# Patient Record
Sex: FEMALE | Race: WHITE | NOT HISPANIC OR LATINO | URBAN - METROPOLITAN AREA
[De-identification: names, ages, dates, MRNs, and addresses within clinical notes are randomized per-mention and may not be internally consistent; named-entity substitution may affect disease eponyms.]

---

## 2018-03-27 ENCOUNTER — APPOINTMENT (RX ONLY)
Dept: URBAN - METROPOLITAN AREA CLINIC 88 | Facility: CLINIC | Age: 78
Setting detail: DERMATOLOGY
End: 2018-03-27

## 2018-03-27 DIAGNOSIS — L50.1 IDIOPATHIC URTICARIA: ICD-10-CM

## 2018-03-27 PROCEDURE — 99202 OFFICE O/P NEW SF 15 MIN: CPT

## 2018-03-27 PROCEDURE — ? COUNSELING

## 2018-03-27 PROCEDURE — ? OTHER

## 2018-03-27 NOTE — PROCEDURE: OTHER
Other (Free Text): ALLEGRA 180 mg q am \\nBENADRYL 50mg qhs
Detail Level: Zone
Note Text (......Xxx Chief Complaint.): This diagnosis correlates with the
Other (Free Text): NO RASH TODAY \\nDISCUSSED ALL REASONS WHY COULD HAVE ANY INFECTIONS ETC AND 50% NO REASONS WHY

## 2018-03-27 NOTE — HPI: OTHER
Condition:: HIVES
Please Describe Your Condition:: X 2 MONTHS COMES AND GOES USED ZANTAC AND ZYRTEC , HYDROXYZINE 25mg MAKES HER SLEEPY